# Patient Record
Sex: MALE | Race: WHITE
[De-identification: names, ages, dates, MRNs, and addresses within clinical notes are randomized per-mention and may not be internally consistent; named-entity substitution may affect disease eponyms.]

---

## 2017-06-13 ENCOUNTER — HOSPITAL ENCOUNTER (INPATIENT)
Dept: HOSPITAL 80 - FED | Age: 72
LOS: 2 days | Discharge: ANOTHER HOSPITAL | DRG: 964 | End: 2017-06-15
Attending: SURGERY | Admitting: SURGERY
Payer: COMMERCIAL

## 2017-06-13 DIAGNOSIS — E11.9: ICD-10-CM

## 2017-06-13 DIAGNOSIS — S32.511A: ICD-10-CM

## 2017-06-13 DIAGNOSIS — S22.41XA: ICD-10-CM

## 2017-06-13 DIAGNOSIS — S06.9X1A: Primary | ICD-10-CM
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
## 2017-06-13 DIAGNOSIS — S32.82XA: ICD-10-CM

## 2017-06-13 DIAGNOSIS — S42.001A: ICD-10-CM

## 2017-06-13 DIAGNOSIS — S32.301A: ICD-10-CM

## 2017-06-13 DIAGNOSIS — S32.471A: ICD-10-CM

## 2017-06-13 DIAGNOSIS — S27.0XXA: ICD-10-CM

## 2017-06-13 DIAGNOSIS — S32.601A: ICD-10-CM

## 2017-06-13 DIAGNOSIS — S32.421A: ICD-10-CM
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
## 2017-06-13 DIAGNOSIS — I10: ICD-10-CM

## 2017-06-13 DIAGNOSIS — Z96.41: ICD-10-CM

## 2017-06-13 LAB
% IMMATURE GRANULYOCYTES: 2.6 % (ref 0–1.1)
ABSOLUTE IMMATURE GRANULOCYTES: 0.24 10^3/UL (ref 0–0.1)
ABSOLUTE NRBC COUNT: 0 10^3/UL (ref 0–0.01)
ADD DIFF?: NO
ADD MORPH?: NO
ADD SCAN?: NO
ANION GAP SERPL CALC-SCNC: 6 MEQ/L (ref 8–16)
APTT BLD: 27.8 SEC (ref 23–38)
ATYPICAL LYMPHOCYTE FLAG: 0 (ref 0–99)
CALCIUM SERPL-MCNC: 9.4 MG/DL (ref 8.5–10.4)
CHLORIDE SERPL-SCNC: 100 MEQ/L (ref 97–110)
CO2 SERPL-SCNC: 25 MEQ/L (ref 22–31)
CREAT SERPL-MCNC: 1.6 MG/DL (ref 0.7–1.3)
ERYTHROCYTE [DISTWIDTH] IN BLOOD BY AUTOMATED COUNT: 13.1 % (ref 11.5–15.2)
FRAGMENT RBC FLAG: 0 (ref 0–99)
GFR SERPL CREATININE-BSD FRML MDRD: 43 ML/MIN/{1.73_M2}
GLUCOSE SERPL-MCNC: 169 MG/DL (ref 70–100)
HCT VFR BLD CALC: 40.3 % (ref 40–51)
HGB BLD-MCNC: 13.5 G/DL (ref 13.7–17.5)
INR PPP: 0.99 (ref 0.83–1.16)
LEFT SHIFT FLG: 20 (ref 0–99)
LIPEMIA HEMOLYSIS FLAG: 80 (ref 0–99)
MCH RBC BLDCO QN: 30.5 PG (ref 27.9–34.1)
MCHC RBC AUTO-ENTMCNC: 33.5 G/DL (ref 32.4–36.7)
MCV RBC AUTO: 91 FL (ref 81.5–99.8)
NRBC-AUTO%: 0 % (ref 0–0.2)
PLATELET # BLD: 208 10^3/UL (ref 150–400)
PLATELET CLUMPS FLAG: 0 (ref 0–99)
PMV BLD AUTO: 10.1 FL (ref 8.7–11.7)
POTASSIUM SERPL-SCNC: 4.5 MEQ/L (ref 3.5–5.2)
PROTHROMBIN TIME: 13 SEC (ref 12–15)
RBC # BLD AUTO: 4.43 10^6/UL (ref 4.4–6.38)
SODIUM SERPL-SCNC: 131 MEQ/L (ref 134–144)

## 2017-06-13 PROCEDURE — 0W9930Z DRAINAGE OF RIGHT PLEURAL CAVITY WITH DRAINAGE DEVICE, PERCUTANEOUS APPROACH: ICD-10-PCS | Performed by: SURGERY

## 2017-06-13 PROCEDURE — G0390 TRAUMA RESPONS W/HOSP CRITI: HCPCS

## 2017-06-13 RX ADMIN — DIAZEPAM PRN MG: 5 TABLET ORAL at 23:33

## 2017-06-13 RX ADMIN — KETOROLAC TROMETHAMINE SCH MG: 15 INJECTION, SOLUTION INTRAMUSCULAR; INTRAVENOUS at 23:00

## 2017-06-13 RX ADMIN — OXYCODONE HYDROCHLORIDE AND ACETAMINOPHEN PRN TAB: 5; 325 TABLET ORAL at 22:53

## 2017-06-13 RX ADMIN — HYDROMORPHONE HCL-SODIUM CHLORIDE 0.9% INJ 6 MG/30ML PRN MG: 0.2 SOLUTION at 22:53
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
## 2017-06-14 LAB
% IMMATURE GRANULYOCYTES: 0.6 % (ref 0–1.1)
% IMMATURE GRANULYOCYTES: 0.7 % (ref 0–1.1)
ABSOLUTE IMMATURE GRANULOCYTES: 0.08 10^3/UL (ref 0–0.1)
ABSOLUTE IMMATURE GRANULOCYTES: 0.09 10^3/UL (ref 0–0.1)
ABSOLUTE NRBC COUNT: 0 10^3/UL (ref 0–0.01)
ABSOLUTE NRBC COUNT: 0 10^3/UL (ref 0–0.01)
ADD DIFF?: NO
ADD DIFF?: NO
ADD MORPH?: NO
ADD MORPH?: NO
ADD SCAN?: NO
ADD SCAN?: NO
ANION GAP SERPL CALC-SCNC: 5 MEQ/L (ref 8–16)
ATYPICAL LYMPHOCYTE FLAG: 0 (ref 0–99)
ATYPICAL LYMPHOCYTE FLAG: 0 (ref 0–99)
CALCIUM SERPL-MCNC: 8.4 MG/DL (ref 8.5–10.4)
CHLORIDE SERPL-SCNC: 105 MEQ/L (ref 97–110)
CO2 SERPL-SCNC: 24 MEQ/L (ref 22–31)
COLOR UR: YELLOW
CREAT SERPL-MCNC: 1.5 MG/DL (ref 0.7–1.3)
ERYTHROCYTE [DISTWIDTH] IN BLOOD BY AUTOMATED COUNT: 13.3 % (ref 11.5–15.2)
ERYTHROCYTE [DISTWIDTH] IN BLOOD BY AUTOMATED COUNT: 13.6 % (ref 11.5–15.2)
EST. AVERAGE GLUCOSE BLD GHB EST-MCNC: 157 MG/DL (ref 68–126)
FRAGMENT RBC FLAG: 0 (ref 0–99)
FRAGMENT RBC FLAG: 0 (ref 0–99)
GFR SERPL CREATININE-BSD FRML MDRD: 46 ML/MIN/{1.73_M2}
GLUCOSE SERPL-MCNC: 142 MG/DL (ref 70–100)
HBA1C MFR BLD: 7.1 % (ref 4–6)
HCT VFR BLD CALC: 34.3 % (ref 40–51)
HCT VFR BLD CALC: 35.8 % (ref 40–51)
HGB BLD-MCNC: 11.3 G/DL (ref 13.7–17.5)
HGB BLD-MCNC: 11.6 G/DL (ref 13.7–17.5)
LEFT SHIFT FLG: 10 (ref 0–99)
LEFT SHIFT FLG: 10 (ref 0–99)
LIPEMIA HEMOLYSIS FLAG: 80 (ref 0–99)
LIPEMIA HEMOLYSIS FLAG: 80 (ref 0–99)
MCH RBC BLDCO QN: 30.4 PG (ref 27.9–34.1)
MCH RBC BLDCO QN: 30.7 PG (ref 27.9–34.1)
MCHC RBC AUTO-ENTMCNC: 32.4 G/DL (ref 32.4–36.7)
MCHC RBC AUTO-ENTMCNC: 32.9 G/DL (ref 32.4–36.7)
MCV RBC AUTO: 92.2 FL (ref 81.5–99.8)
MCV RBC AUTO: 94.7 FL (ref 81.5–99.8)
NITRITE UR QL STRIP: NEGATIVE
NRBC-AUTO%: 0 % (ref 0–0.2)
NRBC-AUTO%: 0 % (ref 0–0.2)
PH UR STRIP: 5 [PH] (ref 5–7.5)
PLATELET # BLD: 162 10^3/UL (ref 150–400)
PLATELET # BLD: 181 10^3/UL (ref 150–400)
PLATELET CLUMPS FLAG: 10 (ref 0–99)
PLATELET CLUMPS FLAG: 10 (ref 0–99)
PMV BLD AUTO: 10 FL (ref 8.7–11.7)
PMV BLD AUTO: 9.8 FL (ref 8.7–11.7)
POTASSIUM SERPL-SCNC: 5 MEQ/L (ref 3.5–5.2)
RBC # BLD AUTO: 3.72 10^6/UL (ref 4.4–6.38)
RBC # BLD AUTO: 3.78 10^6/UL (ref 4.4–6.38)
SODIUM SERPL-SCNC: 134 MEQ/L (ref 134–144)
SP GR UR STRIP: 1.03 (ref 1–1.03)

## 2017-06-14 RX ADMIN — ASPIRIN AND DIPYRIDAMOLE SCH CAP: 25; 200 CAPSULE, EXTENDED RELEASE ORAL at 10:55

## 2017-06-14 RX ADMIN — ASPIRIN AND DIPYRIDAMOLE SCH CAP: 25; 200 CAPSULE, EXTENDED RELEASE ORAL at 20:17

## 2017-06-14 RX ADMIN — DIAZEPAM PRN MG: 5 TABLET ORAL at 20:18

## 2017-06-14 RX ADMIN — KETOROLAC TROMETHAMINE SCH: 15 INJECTION, SOLUTION INTRAMUSCULAR; INTRAVENOUS at 05:35

## 2017-06-14 RX ADMIN — ENOXAPARIN SODIUM SCH MG: 100 INJECTION SUBCUTANEOUS at 10:55

## 2017-06-14 RX ADMIN — OXYCODONE HYDROCHLORIDE AND ACETAMINOPHEN PRN TAB: 5; 325 TABLET ORAL at 06:07

## 2017-06-14 RX ADMIN — OXYCODONE HYDROCHLORIDE AND ACETAMINOPHEN PRN TAB: 5; 325 TABLET ORAL at 20:15

## 2017-06-14 RX ADMIN — THERA TABS SCH EACH: TAB at 10:55

## 2017-06-14 RX ADMIN — DIAZEPAM PRN MG: 5 TABLET ORAL at 06:07
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
## 2017-06-14 NOTE — CPEKG
Heart Rate: 71

RR Interval: 845

P-R Interval: 304

QRSD Interval: 92

QT Interval: 328

QTC Interval: 357

P Axis: 36

QRS Axis: -24

T Wave Axis: 11

EKG Severity - ABNORMAL ECG -

EKG Impression: SINUS RHYTHM

EKG Impression: FIRST DEGREE AV BLOCK

EKG Impression: BORDERLINE T WAVE ABNORMALITIES

Electronically Signed By: Skyler Motley 14-Jun-2017 16:51:39

## 2017-06-14 NOTE — GOP
[f rep st]



                                                                OPERATIVE REPORT





DATE OF OPERATION:  



SURGEON:  Luis Carlos Merida MD



PREOPERATIVE DIAGNOSIS:  Right pneumothorax and multiple right rib fractures.



POSTOPERATIVE DIAGNOSIS:  Right pneumothorax and multiple right rib fractures.



PROCEDURE PERFORMED:  Right tube thoracostomy with IV sedation.



FINDINGS:  



DESCRIPTION OF PROCEDURE:  The patient was consented.  Appropriate time-out was done.  He was preppe
d and draped in usual sterile fashion using IV sedation with Versed and fentanyl.  He was anesthetiz
ed with 1% Xylocaine over the 6th intercostal space in the anterior axillary line.  A short incision
 was made.  Using blunt dissection, the 6th intercostal space was entered.  This was done with 1% Xy
locaine local infiltration.  A #28-Icelandic chest tube was introduced, after having released the pneum
othorax.  There was only a minimal amount of blood in the chest, approximately 50 cc.  Chest tube wa
s secured at the exit site with a 2-0 silk suture and connected to a Pleur-Evac drainage system.  He
 tolerated the procedure reasonably well.  There were no complications.  He was taken to the recover
y room in good condition.





Job #:  [id number]
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
## 2017-06-15 VITALS
DIASTOLIC BLOOD PRESSURE: 47 MMHG | TEMPERATURE: 98.7 F | RESPIRATION RATE: 18 BRPM | OXYGEN SATURATION: 95 % | HEART RATE: 75 BPM | SYSTOLIC BLOOD PRESSURE: 132 MMHG

## 2017-06-15 LAB
ANION GAP SERPL CALC-SCNC: 2 MEQ/L (ref 8–16)
CALCIUM SERPL-MCNC: 8.4 MG/DL (ref 8.5–10.4)
CHLORIDE SERPL-SCNC: 105 MEQ/L (ref 97–110)
CO2 SERPL-SCNC: 23 MEQ/L (ref 22–31)
CREAT SERPL-MCNC: 1.2 MG/DL (ref 0.7–1.3)
GFR SERPL CREATININE-BSD FRML MDRD: 60 ML/MIN/{1.73_M2}
GLUCOSE SERPL-MCNC: 166 MG/DL (ref 70–100)
POTASSIUM SERPL-SCNC: 4.8 MEQ/L (ref 3.5–5.2)
SODIUM SERPL-SCNC: 130 MEQ/L (ref 134–144)

## 2017-06-15 RX ADMIN — THERA TABS SCH EACH: TAB at 10:00

## 2017-06-15 RX ADMIN — OXYCODONE HYDROCHLORIDE AND ACETAMINOPHEN PRN TAB: 5; 325 TABLET ORAL at 07:17

## 2017-06-15 RX ADMIN — TAPENTADOL HYDROCHLORIDE PRN MG: 50 TABLET, FILM COATED ORAL at 13:19

## 2017-06-15 RX ADMIN — HYDROMORPHONE HCL-SODIUM CHLORIDE 0.9% INJ 6 MG/30ML PRN MG: 0.2 SOLUTION at 06:15

## 2017-06-15 RX ADMIN — ASPIRIN AND DIPYRIDAMOLE SCH CAP: 25; 200 CAPSULE, EXTENDED RELEASE ORAL at 10:00

## 2017-06-15 RX ADMIN — ENOXAPARIN SODIUM SCH MG: 100 INJECTION SUBCUTANEOUS at 10:00

## 2017-06-15 RX ADMIN — DIAZEPAM PRN MG: 5 TABLET ORAL at 13:20

## 2017-06-15 RX ADMIN — TAPENTADOL HYDROCHLORIDE PRN MG: 50 TABLET, FILM COATED ORAL at 10:00
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
## 2017-08-26 ENCOUNTER — HOSPITAL ENCOUNTER (OUTPATIENT)
Dept: HOSPITAL 80 - FIMAGING | Age: 72
End: 2017-08-26
Attending: INTERNAL MEDICINE
Payer: COMMERCIAL

## 2017-08-26 DIAGNOSIS — M81.0: ICD-10-CM

## 2017-08-26 DIAGNOSIS — Z13.820: Primary | ICD-10-CM

## 2017-08-26 DIAGNOSIS — Z79.899: ICD-10-CM

## 2017-10-26 ENCOUNTER — HOSPITAL ENCOUNTER (OUTPATIENT)
Dept: HOSPITAL 80 - FIMAGING | Age: 72
End: 2017-10-26
Attending: GENERAL ACUTE CARE HOSPITAL
Payer: COMMERCIAL

## 2017-10-26 ENCOUNTER — HOSPITAL ENCOUNTER (OUTPATIENT)
Dept: HOSPITAL 80 - FCP | Age: 72
End: 2017-10-26
Payer: COMMERCIAL

## 2017-10-26 DIAGNOSIS — S22.41XD: ICD-10-CM

## 2017-10-26 DIAGNOSIS — Z01.811: Primary | ICD-10-CM

## 2017-10-26 DIAGNOSIS — S42.021K: ICD-10-CM

## 2017-10-26 DIAGNOSIS — I44.0: ICD-10-CM

## 2017-10-26 DIAGNOSIS — Z01.810: Primary | ICD-10-CM

## 2017-10-26 NOTE — CPEKG
Heart Rate: 80

RR Interval: 750

P-R Interval: 280

QRSD Interval: 90

QT Interval: 380

QTC Interval: 439

P Axis: 62

QRS Axis: -27

T Wave Axis: 61

EKG Severity - ABNORMAL ECG -

EKG Impression: SINUS RHYTHM

EKG Impression: FIRST DEGREE AV BLOCK

EKG Impression: Right ventricular conduction defect

EKG Impression: Diffuse ST-T wave flattening-- But less than compared to June 14, 2017

Electronically Signed By: Lazaro Cedillo 26-Oct-2017 16:28:36

## 2017-12-12 ENCOUNTER — HOSPITAL ENCOUNTER (OUTPATIENT)
Dept: HOSPITAL 80 - CIMAGING | Age: 72
End: 2017-12-12
Attending: GENERAL ACUTE CARE HOSPITAL
Payer: COMMERCIAL

## 2017-12-12 DIAGNOSIS — M87.051: Primary | ICD-10-CM

## 2018-02-05 ENCOUNTER — HOSPITAL ENCOUNTER (OUTPATIENT)
Dept: HOSPITAL 80 - FIMAGING | Age: 73
End: 2018-02-05
Attending: NURSE PRACTITIONER
Payer: COMMERCIAL

## 2018-02-05 DIAGNOSIS — J44.9: ICD-10-CM

## 2018-02-05 DIAGNOSIS — Z01.818: Primary | ICD-10-CM

## 2018-02-05 DIAGNOSIS — M25.551: ICD-10-CM

## 2018-03-10 ENCOUNTER — HOSPITAL ENCOUNTER (EMERGENCY)
Dept: HOSPITAL 80 - FED | Age: 73
Discharge: HOME | End: 2018-03-10
Payer: COMMERCIAL

## 2018-03-10 VITALS
HEART RATE: 70 BPM | DIASTOLIC BLOOD PRESSURE: 70 MMHG | RESPIRATION RATE: 16 BRPM | SYSTOLIC BLOOD PRESSURE: 151 MMHG | OXYGEN SATURATION: 93 %

## 2018-03-10 VITALS — TEMPERATURE: 98.2 F

## 2018-03-10 DIAGNOSIS — Z85.818: ICD-10-CM

## 2018-03-10 DIAGNOSIS — I10: ICD-10-CM

## 2018-03-10 DIAGNOSIS — E10.9: ICD-10-CM

## 2018-03-10 DIAGNOSIS — S42.021A: Primary | ICD-10-CM
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
## 2018-03-10 DIAGNOSIS — Z87.891: ICD-10-CM
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
## 2018-03-27 ENCOUNTER — HOSPITAL ENCOUNTER (OUTPATIENT)
Dept: HOSPITAL 80 - FIMAGING | Age: 73
End: 2018-03-27
Attending: INTERNAL MEDICINE
Payer: COMMERCIAL

## 2018-03-27 DIAGNOSIS — I65.23: Primary | ICD-10-CM

## 2018-03-27 PROCEDURE — 70498 CT ANGIOGRAPHY NECK: CPT

## 2018-04-12 ENCOUNTER — HOSPITAL ENCOUNTER (OUTPATIENT)
Dept: HOSPITAL 80 - F3E | Age: 73
Discharge: HOME | End: 2018-04-12
Attending: ORTHOPAEDIC SURGERY
Payer: COMMERCIAL

## 2018-04-12 VITALS — SYSTOLIC BLOOD PRESSURE: 126 MMHG | DIASTOLIC BLOOD PRESSURE: 62 MMHG

## 2018-04-12 DIAGNOSIS — Z87.891: ICD-10-CM

## 2018-04-12 DIAGNOSIS — E78.5: ICD-10-CM

## 2018-04-12 DIAGNOSIS — Z86.73: ICD-10-CM

## 2018-04-12 DIAGNOSIS — Z79.4: ICD-10-CM

## 2018-04-12 DIAGNOSIS — Z91.81: ICD-10-CM

## 2018-04-12 DIAGNOSIS — E11.9: ICD-10-CM

## 2018-04-12 DIAGNOSIS — Z85.810: ICD-10-CM

## 2018-04-12 DIAGNOSIS — S42.021K: Primary | ICD-10-CM

## 2018-04-12 PROCEDURE — C1713 ANCHOR/SCREW BN/BN,TIS/BN: HCPCS

## 2018-04-12 PROCEDURE — 0P890ZZ DIVISION OF RIGHT CLAVICLE, OPEN APPROACH: ICD-10-PCS | Performed by: ORTHOPAEDIC SURGERY
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
## 2018-04-12 NOTE — PDHPUP
History & Physical Update


H&P update statement: 


This history and physical update is based on an assessment of the patient which 

was completed after admission or registration (within 24 hours), but prior to 

the surgery/procedure.





H&P update: no change in patient's condition since H&P completed (Patient seen/

examined in conjunction with Dr. Zambrano. )
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
## 2018-04-12 NOTE — POSTOPPROG
Post Op Note


Date of Operation: 04/12/18


Surgeon: Raúl Zambrano


Assistant: TERRA Lopez


Anesthesiologist: Keiko


Anesthesia: GET(General Endotracheal)


Pre-op Diagnosis: R clavicle nonunion


Post-op Diagnosis: same


Procedure: Nonunion takedown, osteotomy and ORIF R clavicle with local bone 

autograft


Inf/Abcess present in the surg proc area at time of surgery?: No


EBL:  (50cc)


Complications: 





none